# Patient Record
Sex: FEMALE | Race: WHITE | NOT HISPANIC OR LATINO | URBAN - METROPOLITAN AREA
[De-identification: names, ages, dates, MRNs, and addresses within clinical notes are randomized per-mention and may not be internally consistent; named-entity substitution may affect disease eponyms.]

---

## 2024-03-09 ENCOUNTER — EMERGENCY (EMERGENCY)
Facility: HOSPITAL | Age: 79
LOS: 1 days | Discharge: HOME | End: 2024-03-09
Attending: STUDENT IN AN ORGANIZED HEALTH CARE EDUCATION/TRAINING PROGRAM
Payer: MEDICARE

## 2024-03-09 VITALS
DIASTOLIC BLOOD PRESSURE: 113 MMHG | HEART RATE: 80 BPM | SYSTOLIC BLOOD PRESSURE: 211 MMHG | OXYGEN SATURATION: 97 % | TEMPERATURE: 98 F | HEIGHT: 65 IN | RESPIRATION RATE: 18 BRPM | WEIGHT: 136.91 LBS

## 2024-03-09 PROCEDURE — 70450 CT HEAD/BRAIN W/O DYE: CPT | Mod: 26,MC

## 2024-03-09 PROCEDURE — 70486 CT MAXILLOFACIAL W/O DYE: CPT | Mod: 26,MC

## 2024-03-09 PROCEDURE — 70450 CT HEAD/BRAIN W/O DYE: CPT | Mod: MC

## 2024-03-09 PROCEDURE — 72125 CT NECK SPINE W/O DYE: CPT | Mod: 26,MC

## 2024-03-09 PROCEDURE — 82962 GLUCOSE BLOOD TEST: CPT

## 2024-03-09 PROCEDURE — 70486 CT MAXILLOFACIAL W/O DYE: CPT | Mod: MC

## 2024-03-09 PROCEDURE — 90471 IMMUNIZATION ADMIN: CPT

## 2024-03-09 PROCEDURE — 90715 TDAP VACCINE 7 YRS/> IM: CPT

## 2024-03-09 PROCEDURE — 73590 X-RAY EXAM OF LOWER LEG: CPT | Mod: 26,RT

## 2024-03-09 PROCEDURE — 73590 X-RAY EXAM OF LOWER LEG: CPT

## 2024-03-09 PROCEDURE — 99285 EMERGENCY DEPT VISIT HI MDM: CPT | Mod: 25

## 2024-03-09 PROCEDURE — 93010 ELECTROCARDIOGRAM REPORT: CPT

## 2024-03-09 PROCEDURE — 99285 EMERGENCY DEPT VISIT HI MDM: CPT

## 2024-03-09 PROCEDURE — 73562 X-RAY EXAM OF KNEE 3: CPT

## 2024-03-09 PROCEDURE — 72125 CT NECK SPINE W/O DYE: CPT | Mod: MC

## 2024-03-09 PROCEDURE — 93005 ELECTROCARDIOGRAM TRACING: CPT

## 2024-03-09 PROCEDURE — 73562 X-RAY EXAM OF KNEE 3: CPT | Mod: 26,RT

## 2024-03-09 RX ORDER — ATENOLOL 25 MG/1
25 TABLET ORAL ONCE
Refills: 0 | Status: COMPLETED | OUTPATIENT
Start: 2024-03-09 | End: 2024-03-09

## 2024-03-09 RX ORDER — TETANUS TOXOID, REDUCED DIPHTHERIA TOXOID AND ACELLULAR PERTUSSIS VACCINE, ADSORBED 5; 2.5; 8; 8; 2.5 [IU]/.5ML; [IU]/.5ML; UG/.5ML; UG/.5ML; UG/.5ML
0.5 SUSPENSION INTRAMUSCULAR ONCE
Refills: 0 | Status: COMPLETED | OUTPATIENT
Start: 2024-03-09 | End: 2024-03-09

## 2024-03-09 RX ORDER — ACETAMINOPHEN 500 MG
975 TABLET ORAL ONCE
Refills: 0 | Status: COMPLETED | OUTPATIENT
Start: 2024-03-09 | End: 2024-03-09

## 2024-03-09 RX ADMIN — Medication 975 MILLIGRAM(S): at 23:54

## 2024-03-09 RX ADMIN — TETANUS TOXOID, REDUCED DIPHTHERIA TOXOID AND ACELLULAR PERTUSSIS VACCINE, ADSORBED 0.5 MILLILITER(S): 5; 2.5; 8; 8; 2.5 SUSPENSION INTRAMUSCULAR at 23:54

## 2024-03-09 RX ADMIN — ATENOLOL 25 MILLIGRAM(S): 25 TABLET ORAL at 23:53

## 2024-03-09 NOTE — ED PROVIDER NOTE - PATIENT PORTAL LINK FT
You can access the FollowMyHealth Patient Portal offered by [facility] by registering at the following website: [email or web address]. By joining [facility]’s FollowMyHealth portal, you will also be able to view your health information using other applications (apps) compatible with our system.

## 2024-03-09 NOTE — ED ADULT TRIAGE NOTE - CHIEF COMPLAINT QUOTE
Patient reports fall today. Patient  states " I slipped and fell while going up the 3 stairs." Active nosebleed noted, sustained abrasions to nose and bilateral knees and lacerations to forehead and right shin. Dressings in place. Patient has hx of hypertension, did not take her meds today. Patient denies LOC, not on blood thinner. Patient reports fall today. Patient  states " while going down the stairs, I slipped and fell 3 steps down." Active nosebleed noted, sustained abrasions to nose and bilateral knees and lacerations to forehead and right shin. Dressings in place. Patient has hx of hypertension, did not take her meds today. Patient denies LOC, not on blood thinner.

## 2024-03-09 NOTE — ED PROVIDER NOTE - NSFOLLOWUPCLINICS_GEN_ALL_ED_FT
[facility]  Otolaryngology (ENT)  [address], [address]  [place] [address]  Phone: [phone]  Fax:     [facility] - ENT  Otolaryngology (ENT)  [address] [place] [address]  Phone: [phone]  Fax:

## 2024-03-09 NOTE — ED PROVIDER NOTE - CLINICAL SUMMARY MEDICAL DECISION MAKING FREE TEXT BOX
78-year-old female with past medical history of hypertension, hyperlipidemia, hypothyroidism slipped on wet stairs and fell forward onto her face and knees.  Patient reports she was able to ambulate after the fall.  Does not recall her last tetanus.  No chest pain, shortness of breath.  Denies any preceding symptoms prior to the fall.  Denies headache, dizziness, abdominal pain, nausea, vomiting.  Patient reports she did not take her blood pressure medication that was due for the evening today.  Generally takes atenolol 25 mg once a day.  Patient is nontoxic-appearing.  No distress.  Noted multiple abrasions of the forehead, nose.  Positive swelling at the bridge of the nose.  No nasal hematoma noted.  Teeth are intact.  No spinal tenderness to palpation.  No paraspinal tenderness to palpation.  No abdominal tenderness to palpation.  Full range of motion in the upper and lower extremities.  Positive abrasions of both knees.  Tenderness to palpation of the right knee at the anterior aspect.  Positive abrasions at the right anterior tib-fib region with overlying tenderness to palpation.  Differential diagnoses include but not limited to abrasions, fracture, head bleed.  Abrasions are cleaned and dressed.

## 2024-03-09 NOTE — ED PROVIDER NOTE - CARE PLAN
Principal Discharge DX:	Multiple abrasions   1 Principal Discharge DX:	Multiple abrasions  Secondary Diagnosis:	Nasal fracture

## 2024-03-09 NOTE — ED PROVIDER NOTE - NSFOLLOWUPINSTRUCTIONS_ED_ALL_ED_FT
You were seen for abrasions that are cleaned and dressed. Please see the attached list to follow up with plastic surgeon.     Your results are attached with your discharge instructions, please review them with your primary care physician. If there is a result pending, you will receive a call if test is positive.    A presumptive diagnosis is made today, but further evaluation may be required by your primary care doctor and/or specialist for a definitive diagnosis. Therefore, follow up as directed and if symptoms change/worsen or any emergency conditions, please return to the ER.    For pain or fever you can tylenol as needed, as directed on packaging.  You can use 500-1000mg Tylenol every 6 hours for pain - as needed.  This is an over-the-counter medications - please respect the warnings on the label. This medication come with certain risks and side effects that you need to discuss with your doctor, especially if you are taking it for a prolonged period.    If needed, call patient access services at [phone] to find a primary care doctor, or call at [phone] to make an appointment at the clinic. You were seen for abrasions that are cleaned and dressed. you have a nasal fracture.    Your results are attached with your discharge instructions, please review them with your primary care physician. If there is a result pending, you will receive a call if test is positive.    A presumptive diagnosis is made today, but further evaluation may be required by your primary care doctor and/or specialist for a definitive diagnosis. Therefore, follow up as directed and if symptoms change/worsen or any emergency conditions, please return to the ER.    For pain or fever you can tylenol as needed, as directed on packaging.  You can use 500-1000mg Tylenol every 6 hours for pain - as needed.  This is an over-the-counter medications - please respect the warnings on the label. This medication come with certain risks and side effects that you need to discuss with your doctor, especially if you are taking it for a prolonged period.    If needed, call patient access services at [phone] to find a primary care doctor, or call at [phone] to make an appointment at the clinic.

## 2024-03-09 NOTE — ED PROVIDER NOTE - PROGRESS NOTE DETAILS
Luanne: ct head and cervical spine unremarkable. ct maxillofacial with nasal fracture minimally displaced. advised ice to nose and avoid blowing/sneezing. f/u with ENT. return precautions discussed.

## 2024-03-10 VITALS
TEMPERATURE: 98 F | HEART RATE: 79 BPM | OXYGEN SATURATION: 97 % | DIASTOLIC BLOOD PRESSURE: 94 MMHG | SYSTOLIC BLOOD PRESSURE: 187 MMHG | RESPIRATION RATE: 18 BRPM

## 2024-03-10 NOTE — ED ADULT NURSE NOTE - CHIEF COMPLAINT QUOTE
Patient reports fall today. Patient  states " while going down the stairs, I slipped and fell 3 steps down." Active nosebleed noted, sustained abrasions to nose and bilateral knees and lacerations to forehead and right shin. Dressings in place. Patient has hx of hypertension, did not take her meds today. Patient denies LOC, not on blood thinner.